# Patient Record
Sex: MALE | Race: BLACK OR AFRICAN AMERICAN
[De-identification: names, ages, dates, MRNs, and addresses within clinical notes are randomized per-mention and may not be internally consistent; named-entity substitution may affect disease eponyms.]

---

## 2022-12-27 ENCOUNTER — NURSE TRIAGE (OUTPATIENT)
Dept: OTHER | Facility: CLINIC | Age: 22
End: 2022-12-27

## 2022-12-27 NOTE — TELEPHONE ENCOUNTER
Location of patient: [place]    Completed triage with patient. Received call from [facility] with [employer]. Subjective: Caller states \"He has been having swollen testicles for 3-4 days. \"     Current Symptoms: swollen testicles     Onset: 3-4 days ago;     Associated Symptoms: NA    Pain Severity: 7/10; with hard pressure, no constant pain     Temperature: None     What has been tried: None    LMP:  Pregnant:     Recommended disposition: Go to ED Now    Care advice provided, patient verbalizes understanding; denies any other questions or concerns; instructed to call back for any new or worsening symptoms. Patient/caller agrees to proceed to nearest Emergency Department    Attention Provider: Thank you for allowing me to participate in the care of your patient. The patient was connected to triage in response to information provided to the E[facility]. Please do not respond through this encounter as the response is not directed to a shared pool.     Reason for Disposition   Swollen scrotum OR lump in the scrotum/groin area   Scrotum is painful or tender to touch    Protocols used: Penis and Scrotum Symptoms-ADULT-[place], Scrotum Swelling-ADULT-[place]